# Patient Record
Sex: FEMALE | URBAN - METROPOLITAN AREA
[De-identification: names, ages, dates, MRNs, and addresses within clinical notes are randomized per-mention and may not be internally consistent; named-entity substitution may affect disease eponyms.]

---

## 2017-05-09 ENCOUNTER — OUTPATIENT (OUTPATIENT)
Dept: OUTPATIENT SERVICES | Facility: HOSPITAL | Age: 42
LOS: 1 days | End: 2017-05-09
Payer: COMMERCIAL

## 2017-05-09 VITALS
WEIGHT: 153 LBS | SYSTOLIC BLOOD PRESSURE: 98 MMHG | DIASTOLIC BLOOD PRESSURE: 56 MMHG | RESPIRATION RATE: 16 BRPM | TEMPERATURE: 98 F | HEIGHT: 62 IN | OXYGEN SATURATION: 100 % | HEART RATE: 75 BPM

## 2017-05-09 DIAGNOSIS — Z98.891 HISTORY OF UTERINE SCAR FROM PREVIOUS SURGERY: Chronic | ICD-10-CM

## 2017-05-09 DIAGNOSIS — S42.402A UNSPECIFIED FRACTURE OF LOWER END OF LEFT HUMERUS, INITIAL ENCOUNTER FOR CLOSED FRACTURE: Chronic | ICD-10-CM

## 2017-05-09 DIAGNOSIS — N93.8 OTHER SPECIFIED ABNORMAL UTERINE AND VAGINAL BLEEDING: ICD-10-CM

## 2017-05-09 DIAGNOSIS — Z90.49 ACQUIRED ABSENCE OF OTHER SPECIFIED PARTS OF DIGESTIVE TRACT: Chronic | ICD-10-CM

## 2017-05-09 DIAGNOSIS — N93.9 ABNORMAL UTERINE AND VAGINAL BLEEDING, UNSPECIFIED: ICD-10-CM

## 2017-05-09 DIAGNOSIS — Z01.818 ENCOUNTER FOR OTHER PREPROCEDURAL EXAMINATION: ICD-10-CM

## 2017-05-09 PROCEDURE — G0463: CPT

## 2017-05-09 RX ORDER — SODIUM CHLORIDE 9 MG/ML
3 INJECTION INTRAMUSCULAR; INTRAVENOUS; SUBCUTANEOUS EVERY 8 HOURS
Refills: 0 | Status: DISCONTINUED | OUTPATIENT
Start: 2017-05-17 | End: 2017-05-17

## 2017-05-09 NOTE — H&P PST ADULT - ASSESSMENT
40 y/o female w/PMH of costochondritis x 2 and seasonal allergies presents to PST with complaints of prolonged and heavy menstrual periods with clots worsening over the past year. CT Abdomen with contrast performed 4/26/17, revealed multiple intramural fibroids. She is scheduled for dilation and curettage, hysteroscopy on 5/17/2017 42 y/o female w/PMH of costochondritis x 2, seasonal allergies w/recurrent sinus infections, chronic anemia, abnormal uterine and vaginal bleeding scheduled for dilation and curettage, hysteroscopy on 5/17/2017. Patient is at low risk for planned surgery

## 2017-05-09 NOTE — H&P PST ADULT - PSH
Fracture of left elbow, closed, initial encounter    History of  section    History of laparoscopic cholecystectomy

## 2017-05-09 NOTE — H&P PST ADULT - PROBLEM SELECTOR PLAN 1
Scheduled for dilation and curettage, hysteroscopy on 5/17/2017. Preoperative instructions discussed and given to patient. Verbalized understanding of instructions

## 2017-05-09 NOTE — H&P PST ADULT - PRO PAIN LIFE ADAPT
decreased activity level/inability or reluctance to perform ADLs/inability to enjoy life/inability to sleep

## 2017-05-09 NOTE — H&P PST ADULT - HISTORY OF PRESENT ILLNESS
40 y/o female w/PMH of costochrondritis x 2 and seasonal allergies presents to PST with complaints of prolonged and heavy menstrual periods with clots worsening over the past year. CT Abdomen with contrast performed 4/26/17, revealed multiple intramural fibroids. She is scheduled for dilation and curettage, hysteroscopy on 5/17/2017 40 y/o female w/PMH of costochondritis x 2, chronic anemia, and seasonal allergies presents to PST with complaints of prolonged and heavy menstrual periods with clots worsening over the past year. CT Abdomen with contrast performed 4/26/17, revealed multiple intramural fibroids. She is scheduled for dilation and curettage, hysteroscopy on 5/17/2017

## 2017-05-09 NOTE — H&P PST ADULT - PMH
Chronic fatigue    Costochondritis  x2 episodes 12/2016  Iron deficiency anemia due to chronic blood loss    Irritable bowel syndrome, unspecified type    Sinus infection

## 2017-05-09 NOTE — H&P PST ADULT - FAMILY HISTORY
Father  Still living? Yes, Estimated age: Age Unknown  Diabetes mellitus, type 2, Age at diagnosis: Age Unknown  Diabetes mellitus, type 2, Age at diagnosis: Age Unknown  Family history of hypertension in father, Age at diagnosis: Age Unknown     Grandparent  Still living? No  Diabetes mellitus, type 2, Age at diagnosis: Age Unknown  Family history of hypertension in maternal grandmother, Age at diagnosis: Age Unknown     Mother  Still living? Yes, Estimated age: Age Unknown  Family history of fibromyalgia, Age at diagnosis: Age Unknown  Family history of uterine leiomyoma, Age at diagnosis: Age Unknown

## 2017-05-09 NOTE — H&P PST ADULT - NSANTHOSAYNRD_GEN_A_CORE
No. MURPHY screening performed.  STOP BANG Legend: 0-2 = LOW Risk; 3-4 = INTERMEDIATE Risk; 5-8 = HIGH Risk

## 2017-05-09 NOTE — H&P PST ADULT - NEGATIVE GENERAL SYMPTOMS
no fever/no chills/no sweating/no anorexia/no weight loss/no weight gain/no polyphagia/no polyuria/no polydipsia/no malaise

## 2017-05-17 ENCOUNTER — OUTPATIENT (OUTPATIENT)
Dept: OUTPATIENT SERVICES | Facility: HOSPITAL | Age: 42
LOS: 1 days | Discharge: HOME | End: 2017-05-17
Payer: COMMERCIAL

## 2017-05-17 VITALS
HEART RATE: 60 BPM | TEMPERATURE: 98 F | DIASTOLIC BLOOD PRESSURE: 53 MMHG | RESPIRATION RATE: 11 BRPM | SYSTOLIC BLOOD PRESSURE: 97 MMHG | OXYGEN SATURATION: 98 %

## 2017-05-17 VITALS
RESPIRATION RATE: 16 BRPM | HEIGHT: 62 IN | HEART RATE: 80 BPM | WEIGHT: 153 LBS | SYSTOLIC BLOOD PRESSURE: 98 MMHG | TEMPERATURE: 98 F | OXYGEN SATURATION: 100 % | DIASTOLIC BLOOD PRESSURE: 54 MMHG

## 2017-05-17 DIAGNOSIS — Z01.818 ENCOUNTER FOR OTHER PREPROCEDURAL EXAMINATION: ICD-10-CM

## 2017-05-17 DIAGNOSIS — N93.8 OTHER SPECIFIED ABNORMAL UTERINE AND VAGINAL BLEEDING: ICD-10-CM

## 2017-05-17 DIAGNOSIS — Z90.49 ACQUIRED ABSENCE OF OTHER SPECIFIED PARTS OF DIGESTIVE TRACT: Chronic | ICD-10-CM

## 2017-05-17 DIAGNOSIS — Z98.891 HISTORY OF UTERINE SCAR FROM PREVIOUS SURGERY: Chronic | ICD-10-CM

## 2017-05-17 DIAGNOSIS — S42.402A UNSPECIFIED FRACTURE OF LOWER END OF LEFT HUMERUS, INITIAL ENCOUNTER FOR CLOSED FRACTURE: Chronic | ICD-10-CM

## 2017-05-17 LAB — HCG UR QL: NEGATIVE — SIGNIFICANT CHANGE UP

## 2017-05-17 PROCEDURE — 88305 TISSUE EXAM BY PATHOLOGIST: CPT | Mod: 26

## 2017-05-17 PROCEDURE — 58558 HYSTEROSCOPY BIOPSY: CPT

## 2017-05-17 PROCEDURE — 81025 URINE PREGNANCY TEST: CPT

## 2017-05-17 PROCEDURE — 88305 TISSUE EXAM BY PATHOLOGIST: CPT

## 2017-05-17 RX ORDER — SODIUM CHLORIDE 9 MG/ML
1000 INJECTION, SOLUTION INTRAVENOUS
Refills: 0 | Status: DISCONTINUED | OUTPATIENT
Start: 2017-05-17 | End: 2017-05-17

## 2017-05-17 RX ORDER — FENTANYL CITRATE 50 UG/ML
25 INJECTION INTRAVENOUS
Refills: 0 | Status: DISCONTINUED | OUTPATIENT
Start: 2017-05-17 | End: 2017-05-17

## 2017-05-17 RX ORDER — KETOROLAC TROMETHAMINE 30 MG/ML
30 SYRINGE (ML) INJECTION ONCE
Refills: 0 | Status: DISCONTINUED | OUTPATIENT
Start: 2017-05-17 | End: 2017-05-17

## 2017-05-17 RX ADMIN — SODIUM CHLORIDE 125 MILLILITER(S): 9 INJECTION, SOLUTION INTRAVENOUS at 11:27

## 2017-05-17 RX ADMIN — SODIUM CHLORIDE 3 MILLILITER(S): 9 INJECTION INTRAMUSCULAR; INTRAVENOUS; SUBCUTANEOUS at 08:02

## 2017-05-17 NOTE — DISCHARGE NOTE ADULT - MEDICATION SUMMARY - MEDICATIONS TO STOP TAKING
I will STOP taking the medications listed below when I get home from the hospital:    clindamycin vaginal cream 2%  -- 1 application vaginally once a day (at bedtime) x 7 days

## 2017-05-17 NOTE — DISCHARGE NOTE ADULT - CARE PLAN
Principal Discharge DX:	Abnormal vaginal bleeding  Goal:	dilation and curretage  Instructions for follow-up, activity and diet:	Pelvic rest,  no sexual intercourse and nothing in vagina ( ie tampons). Naporsyn as needed for pain. No strenuous activity and  no heavy lifting. Follow up with your gynecologist in 1-2wks for your post op visit Principal Discharge DX:	Abnormal vaginal bleeding  Goal:	dilation and curettage  Instructions for follow-up, activity and diet:	Pelvic rest,  no sexual intercourse and nothing in vagina ( ie tampons). Naprosyn as needed for pain. No strenuous activity and  no heavy lifting. Follow up with your gynecologist in 1-2wks for your post op visit

## 2017-05-17 NOTE — DISCHARGE NOTE ADULT - PLAN OF CARE
dilation and curretage Pelvic rest,  no sexual intercourse and nothing in vagina ( ie tampons). Naporsyn as needed for pain. No strenuous activity and  no heavy lifting. Follow up with your gynecologist in 1-2wks for your post op visit dilation and curettage Pelvic rest,  no sexual intercourse and nothing in vagina ( ie tampons). Naprosyn as needed for pain. No strenuous activity and  no heavy lifting. Follow up with your gynecologist in 1-2wks for your post op visit

## 2017-05-17 NOTE — DISCHARGE NOTE ADULT - ADDITIONAL INSTRUCTIONS
Pelvic rest,  no sexual intercourse and nothing in vagina ( ie tampons). Motrin as needed for pain. No strenuous activity and  no heavy lifting. Follow up with your gynecologist in 1-2wks for your post op visit Pelvic rest,  no sexual intercourse and nothing in vagina ( ie tampons). Naprosyn as needed for pain. No strenuous activity and  no heavy lifting. Follow up with your gynecologist in 1-2wks for your post op visit

## 2017-05-17 NOTE — ASU DISCHARGE PLAN (ADULT/PEDIATRIC). - ITEMS TO FOLLOWUP WITH YOUR PHYSICIAN'S
Pelvic rest,  no sexual intercourse and nothing in vagina ( ie tampons). Naprosyn  as needed for pain. No strenuous activity and  no heavy lifting. Keep incision clean and dry. Follow up with your gynecologist in 1-2wks for your post op visit

## 2017-05-17 NOTE — DISCHARGE NOTE ADULT - MEDICATION SUMMARY - MEDICATIONS TO TAKE
I will START or STAY ON the medications listed below when I get home from the hospital:    naproxen 500 mg oral tablet  -- 1 tab(s) by mouth once a day, As Needed -for moderate pain  -- Check with your doctor before becoming pregnant.  May cause drowsiness or dizziness.  Obtain medical advice before taking any non-prescription drugs as some may affect the action of this medication.  Take with food or milk.    -- Indication: For mild pain

## 2017-05-17 NOTE — DISCHARGE NOTE ADULT - PATIENT PORTAL LINK FT
“You can access the FollowHealth Patient Portal, offered by [facility], by registering with the following website: [email or web address]”

## 2017-05-17 NOTE — DISCHARGE NOTE ADULT - NS AS ACTIVITY OBS
for 48 hours/No Heavy lifting/straining/Do not make important decisions/Do not drive or operate machinery/Showering allowed/Driving allowed/Walking-Indoors allowed/Walking-Outdoors allowed/Stairs allowed/Return to Work/School allowed

## 2017-05-17 NOTE — ASU DISCHARGE PLAN (ADULT/PEDIATRIC). - ACTIVITY LEVEL
no heavy lifting/no weight bearing/nothing per rectum/nothing per vagina/no tub baths/no douching/no tampons/no intercourse/no sports/gym

## 2017-05-17 NOTE — DISCHARGE NOTE ADULT - HOSPITAL COURSE
Patient for schedule dilation and curettage  uncomplicated procedure  routine post ambulatory surgery care given

## 2017-05-17 NOTE — DISCHARGE NOTE ADULT - CARE PROVIDER_API CALL
Maura Perez), Psychiatry  [address] [place] [address]  Phone: [phone]  Fax: [phone]

## 2017-05-22 DIAGNOSIS — Z79.2 LONG TERM (CURRENT) USE OF ANTIBIOTICS: ICD-10-CM

## 2017-05-22 DIAGNOSIS — D25.9 LEIOMYOMA OF UTERUS, UNSPECIFIED: ICD-10-CM

## 2017-05-22 DIAGNOSIS — N92.0 EXCESSIVE AND FREQUENT MENSTRUATION WITH REGULAR CYCLE: ICD-10-CM

## 2017-05-22 DIAGNOSIS — N85.4 MALPOSITION OF UTERUS: ICD-10-CM

## 2017-05-22 DIAGNOSIS — K58.9 IRRITABLE BOWEL SYNDROME WITHOUT DIARRHEA: ICD-10-CM

## 2017-05-22 DIAGNOSIS — R53.82 CHRONIC FATIGUE, UNSPECIFIED: ICD-10-CM

## 2017-05-22 DIAGNOSIS — D50.0 IRON DEFICIENCY ANEMIA SECONDARY TO BLOOD LOSS (CHRONIC): ICD-10-CM

## 2022-02-14 NOTE — ASU DISCHARGE PLAN (ADULT/PEDIATRIC). - MEDICATION SUMMARY - MEDICATIONS TO TAKE
I will START or STAY ON the medications listed below when I get home from the hospital:    naproxen 500 mg oral tablet  -- 1 tab(s) by mouth once a day, As Needed -for moderate pain  -- Check with your doctor before becoming pregnant.  May cause drowsiness or dizziness.  Obtain medical advice before taking any non-prescription drugs as some may affect the action of this medication.  Take with food or milk.    -- Indication: For pain no

## 2022-04-28 NOTE — ASU PREOP CHECKLIST - BSA (M2)
1.71 Cosentyx Pregnancy And Lactation Text: This medication is Pregnancy Category B and is considered safe during pregnancy. It is unknown if this medication is excreted in breast milk.

## 2022-07-06 ENCOUNTER — EMERGENCY (EMERGENCY)
Facility: HOSPITAL | Age: 47
LOS: 1 days | Discharge: HOME | End: 2022-07-06
Attending: EMERGENCY MEDICINE | Admitting: EMERGENCY MEDICINE

## 2022-07-06 VITALS
DIASTOLIC BLOOD PRESSURE: 84 MMHG | OXYGEN SATURATION: 96 % | TEMPERATURE: 99 F | WEIGHT: 149.91 LBS | HEIGHT: 62 IN | SYSTOLIC BLOOD PRESSURE: 127 MMHG | HEART RATE: 92 BPM | RESPIRATION RATE: 18 BRPM

## 2022-07-06 VITALS
OXYGEN SATURATION: 97 % | TEMPERATURE: 98 F | HEART RATE: 76 BPM | SYSTOLIC BLOOD PRESSURE: 110 MMHG | RESPIRATION RATE: 16 BRPM | DIASTOLIC BLOOD PRESSURE: 76 MMHG

## 2022-07-06 DIAGNOSIS — S42.402A UNSPECIFIED FRACTURE OF LOWER END OF LEFT HUMERUS, INITIAL ENCOUNTER FOR CLOSED FRACTURE: Chronic | ICD-10-CM

## 2022-07-06 DIAGNOSIS — Z90.49 ACQUIRED ABSENCE OF OTHER SPECIFIED PARTS OF DIGESTIVE TRACT: Chronic | ICD-10-CM

## 2022-07-06 DIAGNOSIS — Z98.891 HISTORY OF UTERINE SCAR FROM PREVIOUS SURGERY: Chronic | ICD-10-CM

## 2022-07-06 PROCEDURE — 71045 X-RAY EXAM CHEST 1 VIEW: CPT | Mod: 26

## 2022-07-06 PROCEDURE — 99284 EMERGENCY DEPT VISIT MOD MDM: CPT

## 2022-07-06 RX ORDER — IBUPROFEN 200 MG
1 TABLET ORAL
Qty: 20 | Refills: 0
Start: 2022-07-06 | End: 2022-07-10

## 2022-07-06 RX ORDER — IBUPROFEN 200 MG
600 TABLET ORAL ONCE
Refills: 0 | Status: COMPLETED | OUTPATIENT
Start: 2022-07-06 | End: 2022-07-06

## 2022-07-06 RX ORDER — ACETAMINOPHEN 500 MG
975 TABLET ORAL ONCE
Refills: 0 | Status: COMPLETED | OUTPATIENT
Start: 2022-07-06 | End: 2022-07-06

## 2022-07-06 RX ADMIN — Medication 600 MILLIGRAM(S): at 12:51

## 2022-07-06 RX ADMIN — Medication 975 MILLIGRAM(S): at 12:52

## 2022-07-06 NOTE — ED ADULT NURSE NOTE - OBJECTIVE STATEMENT
Patient tested COVID positive, c/o cold symptoms: cough, body ache, headache, nasal congestion, low grade fever, and chest tightness, muscle pain to the chest, throat itchiness.

## 2022-07-06 NOTE — ED PROVIDER NOTE - OBJECTIVE STATEMENT
47 y/o F w/known COVID x3 days of sx, vaccinated, p/w myalgias, chills, dry cough, chest tightness, and sore throat. No difficulty breathing. No extremity swelling. Has been taking no meds for sx.

## 2022-07-06 NOTE — ED PROVIDER NOTE - CLINICAL SUMMARY MEDICAL DECISION MAKING FREE TEXT BOX
No hypoxia or difficulty breathing, clear CXR, + vaccinated, on day 3 of sx, d/c home with supportive care and return precautions.

## 2022-07-06 NOTE — ED PROVIDER NOTE - PATIENT PORTAL LINK FT
You can access the FollowMyHealth Patient Portal offered by [facility] by registering at the following website: [email or web address]. By joining [facility]’s FollowMyHealth portal, you will also be able to view your health information using other applications (apps) compatible with our system.

## 2022-07-06 NOTE — ED PROVIDER NOTE - PHYSICAL EXAMINATION
CONSTITUTIONAL: Well appearing, well nourished, awake, alert, oriented to person, place, time/situation and in no apparent distress.  ENT: Airway patent, Nasal mucosa clear. Mouth with normal mucosa.  EYES: Clear bilaterally.  RESPIRATORY: Breathing comfortably with normal RR.  CARDIO: RRR  MSK: Range of motion is not limited, no deformities noted.  NEURO: Alert and oriented, no focal deficits.  SKIN: Skin normal color for race, warm, dry and intact. No evidence of rash.  PSYCH: Alert and oriented to person, place, time/situation. normal mood and affect. no apparent risk to self or others.

## 2022-07-08 DIAGNOSIS — M79.10 MYALGIA, UNSPECIFIED SITE: ICD-10-CM

## 2022-07-08 DIAGNOSIS — U07.1 COVID-19: ICD-10-CM

## 2022-07-08 DIAGNOSIS — Z88.5 ALLERGY STATUS TO NARCOTIC AGENT: ICD-10-CM

## 2022-12-19 NOTE — H&P PST ADULT - BACK
No deformity or limitation of movement Mucosal Advancement Flap Text: Given the location of the defect, shape of the defect and the proximity to free margins a mucosal advancement flap was deemed most appropriate. Incisions were made with a 15 blade scalpel in the appropriate fashion along the cutaneous vermilion border and the mucosal lip. The remaining actinically damaged mucosal tissue was excised.  The mucosal advancement flap was then elevated to the gingival sulcus with care taken to preserve the neurovascular structures and advanced into the primary defect. Care was taken to ensure that precise realignment of the vermilion border was achieved.

## 2024-02-22 RX ORDER — ASCORBIC ACID 60 MG
1 TABLET,CHEWABLE ORAL
Qty: 0 | Refills: 0 | DISCHARGE

## 2024-02-22 RX ORDER — LORATADINE 10 MG/1
1 TABLET ORAL
Qty: 0 | Refills: 0 | DISCHARGE

## 2024-02-22 RX ORDER — VITAMIN E 100 UNIT
1 CAPSULE ORAL
Qty: 0 | Refills: 0 | DISCHARGE